# Patient Record
(demographics unavailable — no encounter records)

---

## 2024-11-12 NOTE — PHYSICAL EXAM
[de-identified] : well healed abdominal inicsion. no drainge. no fevers or chills. no breakdown. no bulging of abdomen. no seroma or hematoma.

## 2024-11-12 NOTE — ASSESSMENT
[FreeTextEntry1] : well appearing male after abdominal wall reconstruction. no concerns. no drainage. ccleared from all restrictions. RTC 6 month. ok to shower.

## 2024-11-12 NOTE — HISTORY OF PRESENT ILLNESS
[FreeTextEntry1] : The patient is a 72 year old male here today for a post op visit s/p abdominal closure after hernia repair (DOS: 7/17/2024).  Doing well/. No fevers or chills. no drainge. pain well controlled. no bulging of abdomen. tolerating diet. feels well

## 2024-11-12 NOTE — PHYSICAL EXAM
[de-identified] : well healed abdominal inicsion. no drainge. no fevers or chills. no breakdown. no bulging of abdomen. no seroma or hematoma.

## 2025-02-04 NOTE — HISTORY OF PRESENT ILLNESS
[FreeTextEntry1] : The patient is a 72 year old male here today for a follow up visit s/p abdominal wall reconstruction (DOS: 7/17/2024). Patient states he recently noticed two "bulges" in his incision and became concerned so her made an appointment to come see us. Patient states he is in no pain at this time and is otherwise feeling okay. Patient denied fever, chills, discoloration of the abdomen, or abdominal pain.

## 2025-02-04 NOTE — END OF VISIT
[FreeTextEntry3] :  I, Dr. Yepez, personally performed the evaluation and management (E/M) services for this  established patient who presents today with (a) new problem(s)/exacerbation of (an) existing  condition(s). That E/M includes conducting the clinically appropriate interval history &/or exam,  assessing all new/exacerbated conditions, and establishing a new plan of care. Today, my CONNIE, was here to observe my evaluation and management service for this new  problem/exacerbated condition and follow the plan of care established by me going forward [Time Spent: ___ minutes] : I have spent [unfilled] minutes of time on the encounter which excludes teaching and separately reported services.

## 2025-02-04 NOTE — PHYSICAL EXAM
[de-identified] : Vertical incision well healed. Two reducible bulges in line with vertical incision. No signs of infection, erythema, or seroma

## 2025-02-04 NOTE — PHYSICAL EXAM
[de-identified] : Vertical incision well healed. Two reducible bulges in line with vertical incision. No signs of infection, erythema, or seroma

## 2025-02-24 NOTE — ASSESSMENT
[FreeTextEntry1] : patient with palpable hernia at the inferior edge of previous defect. Obvious hernia on exam and it is bothering the patinet. Had a long conversation with the patient and he would like it to be repaired. I will coordinate a combined hernia repair with Dr. Roach in surgical onologt. He may need mesh or abdominal muscle flaps.

## 2025-02-24 NOTE — HISTORY OF PRESENT ILLNESS
[FreeTextEntry1] : The patient is a 72 year old male who presents today for a follow up visit s/p abdominal wall reconstruction (DOS: 7/17/2024) and would like to discuss CT results. Patient with obvious hernia an desires correction. No change in complaints from last visit.

## 2025-05-19 NOTE — HISTORY OF PRESENT ILLNESS
[FreeTextEntry1] : The patient is a 73 year old male here today for a post op visit s/p abdominal wall reconstruction (DOS: 4/30/2025).

## 2025-05-22 NOTE — REASON FOR VISIT
[de-identified] : incisional hernia repair [de-identified] : 4/30/25 [de-identified] : 16 [de-identified] : 3

## 2025-05-22 NOTE — PHYSICAL EXAM
[TextEntry] : General: No acute distress. Well nourished and well kept. Head: AT/NC Eyes: PERRL. EOMI. Pulmonary: No respiratory distress. Abdomen: Soft. NT/ND. No rebound, no guarding, no rigidity. No peritoneal signs. No masses. Incision c/d/i - non-tender to palpation, no erythema or drainage at incision site. Palpable periumbilical fluid collection on examination; post-op seroma. Neurological: A&O x 4. Psychiatric: Normal affect and mood.

## 2025-05-22 NOTE — REASON FOR VISIT
[de-identified] : incisional hernia repair [de-identified] : 4/30/25 [de-identified] : 16 [de-identified] : 3

## 2025-05-22 NOTE — HISTORY OF PRESENT ILLNESS
[FreeTextEntry1] : Mr. COOKIE ANN is a 73-year-old male presenting for postoperative evaluation s/p recurrent incisional hernia repair with plastics Dr. Yepez on 04/30/2025. Patient s/p Whipple performed on 11/7/2023 for removal of IPMN with LGD, negative margins, 0/31. S/p incisional hernia repair on 07/17/2024. PMH of HTN, ulcerative colitis, IBS (on mesalamine for 11 years, no adverse reaction), IVC filter placement 11 years ago (after blood clot developed following a 2-month hospitalization for pneumonia and C. diff. Filter still in place, not on anticoagulation or ASA).  On 12/31/2022 patient presented to [facility] c/o abdominal pain and vomiting, diagnosed with pancreatitis. Lipase(>3000), CT of the abdomen noted left lower lobe subsegmental atelectasis, right basilar mass-like consolidation with peripheral calcifications measure 8.0 x 3.0 cm (was worked up in 2017 including PET that was benign), bilobar cysts and subcentimeter indeterminant hypodense foci in the liver, cholelithiasis, cystic lesion in the uncinate process measures 2.8 x 1.2 cm with questionable septation, no bowel obstruction. Conservative management with initiation of a clear liquid diet at that time. Had outpatient MRI post hospitalization on 1/10/23 that noted dilated uncinate process duct; SBIPMN vs sequela of prior pancreatitis. Presented to [facility] again on 9/5/23 with a second bout of pancreatitis, Lipase >3000. Underwent an EUS on 9/22/23 that noted pancreatic duct 12mm in the uncinate. 2 cysts identified in the uncinate; 1.9 and 1.8 cm. No associated mass. CEA fluid- 809.  Of note, the patient received his first COVID booster (3rd shot) in October 2022, two months prior to the first bout of pancreatitis. He received his second COVID booster (4th shot) in August 2023, one month prior to the second bout of pancreatitis. Patient has never had a bout of pancreatitis before 12/2022.  At patient's initial consultation on 10/30/2023, I discussed that patient had a dilated uncinate duct with associated large cyst and elevated CEA fluid and pancreatitis.  Patient s/p Whipple performed on 11/7/2023. 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  He presented 06/27/2024 with reducible umbilical incisional hernia, requesting repair. Patient s/p incisional hernia repair with mesh in conjunction with plastics Dr. Yepez on 07/17/2024. Final pathology showed fibromembranous and adipose tissue with focal fat necrosis. Postoperative CHINTAN drains x2 were placed; he saw shyanne on 7/24 and had both drains removed. Patient presents today for postoperative evaluation. Saw Dr. Yepez on 8/7.  8/2/24 visit: Patient has been feeling well since the hernia repair, eating well and moving his bowels without difficulty. Planned at that time to see patient back in March 2025 with CT as previously planned for surveillance of pancreatic remnant.   He developed a recurrence at the superior portion of the hernia repair. CT abdomen 02/08/2025 demonstrated focal paraumbilical hernia containing bowel with the aperture measuring approximately 3.4 cm. Other finding included s/p Whipple procedure without evidence of residual fluid collections reported pancreatic stent in place and inferior to the postoperative bed versus residual area of hyperdensity surrounding soft tissue, unchanged.  Patient s/p exploratory celiotomy and ventral hernia repair w/ mesh in conjunction with plasticcarol Yepez on 04/30/2025. CHINTAN drain x1. Pathology showed mesothelium-lined fibroconnective tissue consistent with hernia sac. No post-op complications and he was discharged 5/1/25.  Saw shyanne 5/9/25 and is scheduled to f/u 5/21/25.  5/16/25: Patient presents for postoperative evaluation. Had an episode of pain while getting up off the couch, resolved.

## 2025-05-22 NOTE — REASON FOR VISIT
[de-identified] : incisional hernia repair [de-identified] : 4/30/25 [de-identified] : 16 [de-identified] : 3

## 2025-05-22 NOTE — HISTORY OF PRESENT ILLNESS
[FreeTextEntry1] : Mr. COOKIE ANN is a 73-year-old male presenting for postoperative evaluation s/p recurrent incisional hernia repair with plastics Dr. Yepez on 04/30/2025. Patient s/p Whipple performed on 11/7/2023 for removal of IPMN with LGD, negative margins, 0/31. S/p incisional hernia repair on 07/17/2024. PMH of HTN, ulcerative colitis, IBS (on mesalamine for 11 years, no adverse reaction), IVC filter placement 11 years ago (after blood clot developed following a 2-month hospitalization for pneumonia and C. diff. Filter still in place, not on anticoagulation or ASA).  On 12/31/2022 patient presented to [facility] c/o abdominal pain and vomiting, diagnosed with pancreatitis. Lipase(>3000), CT of the abdomen noted left lower lobe subsegmental atelectasis, right basilar mass-like consolidation with peripheral calcifications measure 8.0 x 3.0 cm (was worked up in 2017 including PET that was benign), bilobar cysts and subcentimeter indeterminant hypodense foci in the liver, cholelithiasis, cystic lesion in the uncinate process measures 2.8 x 1.2 cm with questionable septation, no bowel obstruction. Conservative management with initiation of a clear liquid diet at that time. Had outpatient MRI post hospitalization on 1/10/23 that noted dilated uncinate process duct; SBIPMN vs sequela of prior pancreatitis. Presented to [facility] again on 9/5/23 with a second bout of pancreatitis, Lipase >3000. Underwent an EUS on 9/22/23 that noted pancreatic duct 12mm in the uncinate. 2 cysts identified in the uncinate; 1.9 and 1.8 cm. No associated mass. CEA fluid- 809.  Of note, the patient received his first COVID booster (3rd shot) in October 2022, two months prior to the first bout of pancreatitis. He received his second COVID booster (4th shot) in August 2023, one month prior to the second bout of pancreatitis. Patient has never had a bout of pancreatitis before 12/2022.  At patient's initial consultation on 10/30/2023, I discussed that patient had a dilated uncinate duct with associated large cyst and elevated CEA fluid and pancreatitis.  Patient s/p Whipple performed on 11/7/2023. 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  He presented 06/27/2024 with reducible umbilical incisional hernia, requesting repair. Patient s/p incisional hernia repair with mesh in conjunction with plastics Dr. Yepez on 07/17/2024. Final pathology showed fibromembranous and adipose tissue with focal fat necrosis. Postoperative CHINTAN drains x2 were placed; he saw shyanne on 7/24 and had both drains removed. Patient presents today for postoperative evaluation. Saw Dr. Yepez on 8/7.  8/2/24 visit: Patient has been feeling well since the hernia repair, eating well and moving his bowels without difficulty. Planned at that time to see patient back in March 2025 with CT as previously planned for surveillance of pancreatic remnant.   He developed a recurrence at the superior portion of the hernia repair. CT abdomen 02/08/2025 demonstrated focal paraumbilical hernia containing bowel with the aperture measuring approximately 3.4 cm. Other finding included s/p Whipple procedure without evidence of residual fluid collections reported pancreatic stent in place and inferior to the postoperative bed versus residual area of hyperdensity surrounding soft tissue, unchanged.  Patient s/p exploratory celiotomy and ventral hernia repair w/ mesh in conjunction with plasticcarol Yepez on 04/30/2025. CHINTAN drain x1. Pathology showed mesothelium-lined fibroconnective tissue consistent with hernia sac. No post-op complications and he was discharged 5/1/25.  Saw shyanne 5/9/25 and is scheduled to f/u 5/21/25.  5/16/25: Patient presents for postoperative evaluation. Had an episode of pain while getting up off the couch, resolved.

## 2025-05-22 NOTE — END OF VISIT
[FreeTextEntry3] : By signing my name below, I, Mecrystalkorey Howard, attest that this documentation has been prepared under the direction and in the presence of Gianni Roach MD in the following sections HISTORY OF PRESENT ILLNESS; PAST MEDICAL/FAMILY/SOCIAL HISTORY; REVIEW OF SYSTEMS; PHYSICAL EXAM; ASSESSMENT/PLAN.

## 2025-05-22 NOTE — HISTORY OF PRESENT ILLNESS
[FreeTextEntry1] : Mr. COOKIE ANN is a 73-year-old male presenting for postoperative evaluation s/p recurrent incisional hernia repair with plastics Dr. Yepez on 04/30/2025. Patient s/p Whipple performed on 11/7/2023 for removal of IPMN with LGD, negative margins, 0/31. S/p incisional hernia repair on 07/17/2024. PMH of HTN, ulcerative colitis, IBS (on mesalamine for 11 years, no adverse reaction), IVC filter placement 11 years ago (after blood clot developed following a 2-month hospitalization for pneumonia and C. diff. Filter still in place, not on anticoagulation or ASA).  On 12/31/2022 patient presented to [facility] c/o abdominal pain and vomiting, diagnosed with pancreatitis. Lipase(>3000), CT of the abdomen noted left lower lobe subsegmental atelectasis, right basilar mass-like consolidation with peripheral calcifications measure 8.0 x 3.0 cm (was worked up in 2017 including PET that was benign), bilobar cysts and subcentimeter indeterminant hypodense foci in the liver, cholelithiasis, cystic lesion in the uncinate process measures 2.8 x 1.2 cm with questionable septation, no bowel obstruction. Conservative management with initiation of a clear liquid diet at that time. Had outpatient MRI post hospitalization on 1/10/23 that noted dilated uncinate process duct; SBIPMN vs sequela of prior pancreatitis. Presented to [facility] again on 9/5/23 with a second bout of pancreatitis, Lipase >3000. Underwent an EUS on 9/22/23 that noted pancreatic duct 12mm in the uncinate. 2 cysts identified in the uncinate; 1.9 and 1.8 cm. No associated mass. CEA fluid- 809.  Of note, the patient received his first COVID booster (3rd shot) in October 2022, two months prior to the first bout of pancreatitis. He received his second COVID booster (4th shot) in August 2023, one month prior to the second bout of pancreatitis. Patient has never had a bout of pancreatitis before 12/2022.  At patient's initial consultation on 10/30/2023, I discussed that patient had a dilated uncinate duct with associated large cyst and elevated CEA fluid and pancreatitis.  Patient s/p Whipple performed on 11/7/2023. 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  He presented 06/27/2024 with reducible umbilical incisional hernia, requesting repair. Patient s/p incisional hernia repair with mesh in conjunction with plastics Dr. Yepez on 07/17/2024. Final pathology showed fibromembranous and adipose tissue with focal fat necrosis. Postoperative CHINTAN drains x2 were placed; he saw shyanne on 7/24 and had both drains removed. Patient presents today for postoperative evaluation. Saw Dr. Yepez on 8/7.  8/2/24 visit: Patient has been feeling well since the hernia repair, eating well and moving his bowels without difficulty. Planned at that time to see patient back in March 2025 with CT as previously planned for surveillance of pancreatic remnant.   He developed a recurrence at the superior portion of the hernia repair. CT abdomen 02/08/2025 demonstrated focal paraumbilical hernia containing bowel with the aperture measuring approximately 3.4 cm. Other finding included s/p Whipple procedure without evidence of residual fluid collections reported pancreatic stent in place and inferior to the postoperative bed versus residual area of hyperdensity surrounding soft tissue, unchanged.  Patient s/p exploratory celiotomy and ventral hernia repair w/ mesh in conjunction with plasticcarol Yepez on 04/30/2025. CHINTAN drain x1. Pathology showed mesothelium-lined fibroconnective tissue consistent with hernia sac. No post-op complications and he was discharged 5/1/25.  Saw shyanne 5/9/25 and is scheduled to f/u 5/21/25.  5/16/25: Patient presents for postoperative evaluation. Had an episode of pain while getting up off the couch, resolved.

## 2025-05-22 NOTE — HISTORY OF PRESENT ILLNESS
[FreeTextEntry1] : Mr. COOKIE ANN is a 73-year-old male presenting for postoperative evaluation s/p recurrent incisional hernia repair with plastics Dr. Yepez on 04/30/2025. Patient s/p Whipple performed on 11/7/2023 for removal of IPMN with LGD, negative margins, 0/31. S/p incisional hernia repair on 07/17/2024. PMH of HTN, ulcerative colitis, IBS (on mesalamine for 11 years, no adverse reaction), IVC filter placement 11 years ago (after blood clot developed following a 2-month hospitalization for pneumonia and C. diff. Filter still in place, not on anticoagulation or ASA).  On 12/31/2022 patient presented to [facility] c/o abdominal pain and vomiting, diagnosed with pancreatitis. Lipase(>3000), CT of the abdomen noted left lower lobe subsegmental atelectasis, right basilar mass-like consolidation with peripheral calcifications measure 8.0 x 3.0 cm (was worked up in 2017 including PET that was benign), bilobar cysts and subcentimeter indeterminant hypodense foci in the liver, cholelithiasis, cystic lesion in the uncinate process measures 2.8 x 1.2 cm with questionable septation, no bowel obstruction. Conservative management with initiation of a clear liquid diet at that time. Had outpatient MRI post hospitalization on 1/10/23 that noted dilated uncinate process duct; SBIPMN vs sequela of prior pancreatitis. Presented to [facility] again on 9/5/23 with a second bout of pancreatitis, Lipase >3000. Underwent an EUS on 9/22/23 that noted pancreatic duct 12mm in the uncinate. 2 cysts identified in the uncinate; 1.9 and 1.8 cm. No associated mass. CEA fluid- 809.  Of note, the patient received his first COVID booster (3rd shot) in October 2022, two months prior to the first bout of pancreatitis. He received his second COVID booster (4th shot) in August 2023, one month prior to the second bout of pancreatitis. Patient has never had a bout of pancreatitis before 12/2022.  At patient's initial consultation on 10/30/2023, I discussed that patient had a dilated uncinate duct with associated large cyst and elevated CEA fluid and pancreatitis.  Patient s/p Whipple performed on 11/7/2023. 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  He presented 06/27/2024 with reducible umbilical incisional hernia, requesting repair. Patient s/p incisional hernia repair with mesh in conjunction with plastics Dr. Yepez on 07/17/2024. Final pathology showed fibromembranous and adipose tissue with focal fat necrosis. Postoperative CHINTAN drains x2 were placed; he saw shyanne on 7/24 and had both drains removed. Patient presents today for postoperative evaluation. Saw Dr. Yepez on 8/7.  8/2/24 visit: Patient has been feeling well since the hernia repair, eating well and moving his bowels without difficulty. Planned at that time to see patient back in March 2025 with CT as previously planned for surveillance of pancreatic remnant.   He developed a recurrence at the superior portion of the hernia repair. CT abdomen 02/08/2025 demonstrated focal paraumbilical hernia containing bowel with the aperture measuring approximately 3.4 cm. Other finding included s/p Whipple procedure without evidence of residual fluid collections reported pancreatic stent in place and inferior to the postoperative bed versus residual area of hyperdensity surrounding soft tissue, unchanged.  Patient s/p exploratory celiotomy and ventral hernia repair w/ mesh in conjunction with plasticcarol Yepez on 04/30/2025. CHINTAN drain x1. Pathology showed mesothelium-lined fibroconnective tissue consistent with hernia sac. No post-op complications and he was discharged 5/1/25.  Saw shyanne 5/9/25 and is scheduled to f/u 5/21/25.  5/16/25: Patient presents for postoperative evaluation. Had an episode of pain while getting up off the couch, resolved.

## 2025-05-22 NOTE — REASON FOR VISIT
[de-identified] : incisional hernia repair [de-identified] : 4/30/25 [de-identified] : 16 [de-identified] : 3

## 2025-05-22 NOTE — ASSESSMENT
[FreeTextEntry1] : Mr. COOKIE ANN is a 73-year-old male presenting for postoperative evaluation s/p incisional hernia repair with plastics Dr. Yepez on 4/30/25. PMH of HTN, ulcerative colitis, IBS (on mesalamine for 11 years, no adverse reaction), IVC filter placement 11 years ago (after blood clot developed following a 2-month hospitalization for pneumonia and C. diff. Filter still in place, not on anticoagulation or ASA).  Patient s/p Whipple performed on 11/7/2023. 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 Subsequently s/p incisional hernia repair with mesh in conjunction with plastics Dr. Yepez on 07/17/2024. Final pathology showed fibromembranous and adipose tissue with focal fat necrosis.  He developed a recurrence at the superior portion of the hernia repair.  Patient s/p exploratory celiotomy and ventral hernia repair w/ mesh in conjunction with plastics Dr. Yepez on 04/30/2025. CHINTAN drain x1. Pathology showed mesothelium-lined fibroconnective tissue consistent with hernia sac. No post-op complications and he was discharged 5/1/25.  Saw plastics 5/9/25 and is scheduled to f/u 5/21/25.  Patient presents for postoperative evaluation. Hernia repair healing appropriately. Palpable periumbilical fluid collection on examination, likely post-op seroma which warrants no intervention given it is asymptomatic. Instructed him to continue to wear abdominal binder for comfort.  He is scheduled to follow up with plasticcarol Yepez 5/21/25. We will plan to see patient back in 1 year with repeat CT at that time for surveillance of resected IPMN with LGD. Instructed him to call my office if he develops persistent abdominal pain, abdominal bulged. Patient verbalized understanding.